# Patient Record
Sex: FEMALE | Race: WHITE | NOT HISPANIC OR LATINO | Employment: FULL TIME | URBAN - METROPOLITAN AREA
[De-identification: names, ages, dates, MRNs, and addresses within clinical notes are randomized per-mention and may not be internally consistent; named-entity substitution may affect disease eponyms.]

---

## 2024-02-01 RX ORDER — LEVOTHYROXINE SODIUM 25 UG/1
25 TABLET ORAL
COMMUNITY
Start: 2023-12-20

## 2024-02-09 NOTE — PROGRESS NOTES
REASON FOR VISIT:      HPI:  Saloni Judge is a 53 y.o. female who presents for NPV     Pt with hx of diarrhea 2-4 years   Screening colonoscopy - moderate inflammation in ileum   CTE 02/02/24 noted 7.5cm of inflammed TI, narrowing with mild upstream dilation  Dr. Georges prescribed budesonide - pt did not start  Moving bowels 2-3x per day. Mostly loose   Denies BRB, melena, mucous  Denies n/v   Denies abdominal pain   Weight stable   No skin rash, joint pain, mm ulcerations        Fam hx-brother , and cousin - Crohn's , no CRC, no celiac   Social hx - non smoker, no etoh , No NSAIDS          Med list notable for    Labs notable for    No fhx of CRC.       Prev endoscopic eval:   >> Colonoscopy 01/26/2024 Dr. Georges   Impression:            - Moderate inflammation was found in the ileum                          secondary to ileitis. Biopsied.                          - Ileal diverticula.                          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                          - Scar in the ascending colon.                          - The examination was otherwise normal on direct                          and retroflexion views.                          - Biopsies were taken with a cold forceps for                          histology in the entire colon.     REVIEW OF SYSTEMS    Review of Systems   Constitutional: Negative for chills, decreased appetite, fever and weight loss.   Respiratory:  Negative for cough and shortness of breath.    Musculoskeletal:  Positive for joint pain.   Gastrointestinal:  Positive for diarrhea. Negative for bloating, abdominal pain, change in bowel habit, constipation, dysphagia, excessive appetite, heartburn, hematemesis, hematochezia, jaundice, melena, nausea and vomiting.          Not on File    Past Medical History:   Diagnosis Date    Gross hematuria 09/13/2016       No past surgical history on file.    No family history on file.    Social History     Tobacco Use    Smoking status: Not on  file    Smokeless tobacco: Not on file   Substance Use Topics    Alcohol use: Not on file       Current Outpatient Medications   Medication Sig Dispense Refill    levothyroxine (Synthroid, Levoxyl) 25 mcg tablet Take 1 tablet (25 mcg) by mouth once daily.       No current facility-administered medications for this visit.       PHYSICAL EXAM:  There were no vitals taken for this visit.     Physical Exam  Constitutional:       Comments: Awake   HENT:      Head: Normocephalic.   Cardiovascular:      Rate and Rhythm: Normal rate and regular rhythm.   Pulmonary:      Effort: Pulmonary effort is normal.      Breath sounds: Normal breath sounds.   Abdominal:      General: Bowel sounds are normal.      Palpations: Abdomen is soft.   Neurological:      Mental Status: She is alert.   Psychiatric:         Mood and Affect: Mood normal.          ASSESSMENT  53-year-old female presents to [facility] care.  She underwent a screening colonoscopy that identified ulcerations and stenosis in the terminal ileum.  She underwent a CT enterography that identified 7.5 cm segment of terminal ileal wall thickening and mucosal enhancement.  There was areas of luminal narrowing with borderline upstream dilation.  Colon was unremarkable on the colonoscopy and CT enterography.  Patient reports symptoms of occasional diarrhea that she attributed to stress.  Her brother was diagnosed with Crohn's disease at a young age and underwent surgical resection and is now on Humira. she also has a cousin with inflammatory bowel disease  .  I suspect that she will need a biologic agent.  I will check TB and hepatitis B core levels now as well as a fecal calprotectin.  She has the prescription for budesonide which I have asked her to start at 9 mg a day which she can taper to 6 mg in 1 month and then 3 mg 1 month later.  I would repeat the CT enterography at the end of the month to to evaluate the narrowing at the terminal ileum.  In the meantime we will apply  for Entyvio infusions which she is agreeable to  Follow-up with Jie Farrell CNP in 3 months      Evaluation requested by Dr. Smith primary care provider on file..   My final recommendations will be communicated back to the requesting physician by way of shared Medical record or letter to requesting physician via fax.      Attending Note:   I have personally performed a face to face assessment of this Patient, which included an interview, physical exam and Assessment and Plan.  I have reviewed and confirmed the history and key findings as documented by the Medical Assistant and edited as appropriate.     Signature: Nora Toure MD    Date: 2/9/2024  Time: 1:32 PM

## 2024-02-13 ENCOUNTER — OFFICE VISIT (OUTPATIENT)
Dept: GASTROENTEROLOGY | Facility: CLINIC | Age: 54
End: 2024-02-13
Payer: COMMERCIAL

## 2024-02-13 ENCOUNTER — LAB (OUTPATIENT)
Dept: LAB | Facility: LAB | Age: 54
End: 2024-02-13
Payer: COMMERCIAL

## 2024-02-13 VITALS
RESPIRATION RATE: 16 BRPM | BODY MASS INDEX: 18.53 KG/M2 | SYSTOLIC BLOOD PRESSURE: 125 MMHG | TEMPERATURE: 97.3 F | HEIGHT: 62 IN | WEIGHT: 100.7 LBS | HEART RATE: 88 BPM | OXYGEN SATURATION: 98 % | DIASTOLIC BLOOD PRESSURE: 68 MMHG

## 2024-02-13 DIAGNOSIS — K50.00 CROHN'S DISEASE OF SMALL INTESTINE WITHOUT COMPLICATION (MULTI): Primary | ICD-10-CM

## 2024-02-13 DIAGNOSIS — K50.00 CROHN'S DISEASE OF SMALL INTESTINE WITHOUT COMPLICATION (MULTI): ICD-10-CM

## 2024-02-13 LAB
ALBUMIN SERPL BCP-MCNC: 4.2 G/DL (ref 3.4–5)
ALP SERPL-CCNC: 48 U/L (ref 33–110)
ALT SERPL W P-5'-P-CCNC: 5 U/L (ref 7–45)
ANION GAP SERPL CALC-SCNC: 12 MMOL/L (ref 10–20)
AST SERPL W P-5'-P-CCNC: 15 U/L (ref 9–39)
BASOPHILS # BLD AUTO: 0.03 X10*3/UL (ref 0–0.1)
BASOPHILS NFR BLD AUTO: 0.4 %
BILIRUB SERPL-MCNC: 0.6 MG/DL (ref 0–1.2)
BUN SERPL-MCNC: 18 MG/DL (ref 6–23)
CALCIUM SERPL-MCNC: 9.4 MG/DL (ref 8.6–10.3)
CHLORIDE SERPL-SCNC: 101 MMOL/L (ref 98–107)
CO2 SERPL-SCNC: 31 MMOL/L (ref 21–32)
CREAT SERPL-MCNC: 0.73 MG/DL (ref 0.5–1.05)
CRP SERPL-MCNC: <0.1 MG/DL
EGFRCR SERPLBLD CKD-EPI 2021: >90 ML/MIN/1.73M*2
EOSINOPHIL # BLD AUTO: 0.05 X10*3/UL (ref 0–0.7)
EOSINOPHIL NFR BLD AUTO: 0.7 %
ERYTHROCYTE [DISTWIDTH] IN BLOOD BY AUTOMATED COUNT: 12.5 % (ref 11.5–14.5)
ERYTHROCYTE [SEDIMENTATION RATE] IN BLOOD BY WESTERGREN METHOD: 15 MM/H (ref 0–30)
GLUCOSE SERPL-MCNC: 85 MG/DL (ref 74–99)
HBV CORE AB SER QL: NONREACTIVE
HCT VFR BLD AUTO: 39.2 % (ref 36–46)
HGB BLD-MCNC: 12.9 G/DL (ref 12–16)
IMM GRANULOCYTES # BLD AUTO: 0.02 X10*3/UL (ref 0–0.7)
IMM GRANULOCYTES NFR BLD AUTO: 0.3 % (ref 0–0.9)
LYMPHOCYTES # BLD AUTO: 1.13 X10*3/UL (ref 1.2–4.8)
LYMPHOCYTES NFR BLD AUTO: 15.3 %
MCH RBC QN AUTO: 29.9 PG (ref 26–34)
MCHC RBC AUTO-ENTMCNC: 32.9 G/DL (ref 32–36)
MCV RBC AUTO: 91 FL (ref 80–100)
MONOCYTES # BLD AUTO: 0.39 X10*3/UL (ref 0.1–1)
MONOCYTES NFR BLD AUTO: 5.3 %
NEUTROPHILS # BLD AUTO: 5.78 X10*3/UL (ref 1.2–7.7)
NEUTROPHILS NFR BLD AUTO: 78 %
NRBC BLD-RTO: 0 /100 WBCS (ref 0–0)
PLATELET # BLD AUTO: 321 X10*3/UL (ref 150–450)
POTASSIUM SERPL-SCNC: 3.7 MMOL/L (ref 3.5–5.3)
PROT SERPL-MCNC: 7.4 G/DL (ref 6.4–8.2)
RBC # BLD AUTO: 4.31 X10*6/UL (ref 4–5.2)
SODIUM SERPL-SCNC: 140 MMOL/L (ref 136–145)
WBC # BLD AUTO: 7.4 X10*3/UL (ref 4.4–11.3)

## 2024-02-13 PROCEDURE — 1036F TOBACCO NON-USER: CPT | Performed by: INTERNAL MEDICINE

## 2024-02-13 PROCEDURE — 85025 COMPLETE CBC W/AUTO DIFF WBC: CPT

## 2024-02-13 PROCEDURE — 86704 HEP B CORE ANTIBODY TOTAL: CPT

## 2024-02-13 PROCEDURE — 99214 OFFICE O/P EST MOD 30 MIN: CPT | Performed by: INTERNAL MEDICINE

## 2024-02-13 PROCEDURE — 85652 RBC SED RATE AUTOMATED: CPT

## 2024-02-13 PROCEDURE — 80053 COMPREHEN METABOLIC PANEL: CPT

## 2024-02-13 PROCEDURE — 36415 COLL VENOUS BLD VENIPUNCTURE: CPT

## 2024-02-13 PROCEDURE — 86140 C-REACTIVE PROTEIN: CPT

## 2024-02-13 PROCEDURE — 86481 TB AG RESPONSE T-CELL SUSP: CPT

## 2024-02-13 RX ORDER — BUDESONIDE 3 MG/1
9 CAPSULE, COATED PELLETS ORAL EVERY MORNING
Qty: 90 CAPSULE | Refills: 2 | Status: SHIPPED | OUTPATIENT
Start: 2024-02-13

## 2024-02-13 ASSESSMENT — ENCOUNTER SYMPTOMS
WEIGHT LOSS: 0
CONSTIPATION: 0
DIARRHEA: 1
NAUSEA: 0
BLOATING: 0
DECREASED APPETITE: 0
FEVER: 0
HEARTBURN: 0
JAUNDICE: 0
CHANGE IN BOWEL HABIT: 0
HEMATOCHEZIA: 0
COUGH: 0
EXCESSIVE APPETITE: 0
VOMITING: 0
ABDOMINAL PAIN: 0
HEMATEMESIS: 0
CHILLS: 0
SHORTNESS OF BREATH: 0

## 2024-02-15 LAB
NIL(NEG) CONTROL SPOT COUNT: NORMAL
PANEL A SPOT COUNT: 0
PANEL B SPOT COUNT: 0
POS CONTROL SPOT COUNT: NORMAL
T-SPOT. TB INTERPRETATION: NEGATIVE

## 2024-02-16 ENCOUNTER — TELEPHONE (OUTPATIENT)
Dept: GASTROENTEROLOGY | Facility: CLINIC | Age: 54
End: 2024-02-16
Payer: COMMERCIAL

## 2024-02-16 NOTE — TELEPHONE ENCOUNTER
----- Message from Nora RAYMUNDO MD sent at 2/15/2024  2:21 PM EST -----  The labs look good overall - the inflammatory markers are normal which is not uncommon in the setting of mild disease. Please start the budesonide as we discussed. My office will apply for the Entivyo infusions.   Please keep your appt as scheduled.   Dilution (U/0.1 Cc): 1.1

## 2024-02-16 NOTE — TELEPHONE ENCOUNTER
Spoke with patient in regards to Entyvio infusions. She does not wish to start authorization process at this time. She would like to think it over and call the office back if she wishes for us to apply for approval. Nurse line given

## 2024-03-04 ENCOUNTER — TELEPHONE (OUTPATIENT)
Dept: GASTROENTEROLOGY | Facility: CLINIC | Age: 54
End: 2024-03-04
Payer: COMMERCIAL

## 2024-03-04 NOTE — TELEPHONE ENCOUNTER
Pt left VM on nurse's line that she is going to move forward with GI specialist through [facility] and she would like to cancel her follow up apt.

## 2024-05-22 ENCOUNTER — APPOINTMENT (OUTPATIENT)
Dept: GASTROENTEROLOGY | Facility: CLINIC | Age: 54
End: 2024-05-22
Payer: COMMERCIAL